# Patient Record
Sex: FEMALE | Race: WHITE | Employment: UNEMPLOYED | URBAN - METROPOLITAN AREA
[De-identification: names, ages, dates, MRNs, and addresses within clinical notes are randomized per-mention and may not be internally consistent; named-entity substitution may affect disease eponyms.]

---

## 2017-10-10 ENCOUNTER — OFFICE VISIT (OUTPATIENT)
Dept: PHYSICAL THERAPY | Facility: HOSPITAL | Age: 57
End: 2017-10-10
Attending: INTERNAL MEDICINE
Payer: COMMERCIAL

## 2017-10-10 DIAGNOSIS — M25.552 LEFT HIP PAIN: ICD-10-CM

## 2017-10-10 PROCEDURE — 97162 PT EVAL MOD COMPLEX 30 MIN: CPT

## 2017-10-10 PROCEDURE — 97530 THERAPEUTIC ACTIVITIES: CPT

## 2017-10-10 NOTE — PROGRESS NOTES
LOWER EXTREMITY EVALUATION:   Referring Physician: Dr. Damien Selby  Date of Onset: 2 weeks ago Date of Service: 10/10/2017   Diagnosis: Left hip pain  PATIENT SUMMARY:   Lindsay Padilla is a 62year old y/o female who presents to therapy today with complaints trunk in sitting  Gait: slow gait speed, slight antalgia    Lumbar screen:   Lumbar AROM: flex mod loss, ext severe loss, R lat flex no loss, L lat flex mod loss, R rotation no loss, L rotation min loss  Repeated motion testing:   Sitting with lumbar roll period. Treatment will include: Manual Therapy; Therapeutic Exercises; Neuromuscular Re-education; Therapeutic Activity;  Patient education; Home exercise program instruction    Education or treatment limitation: None  Rehab Potential:good    FOTO: 33/100

## 2017-10-12 ENCOUNTER — OFFICE VISIT (OUTPATIENT)
Dept: PHYSICAL THERAPY | Facility: HOSPITAL | Age: 57
End: 2017-10-12
Attending: INTERNAL MEDICINE
Payer: COMMERCIAL

## 2017-10-12 PROCEDURE — 97110 THERAPEUTIC EXERCISES: CPT

## 2017-10-16 ENCOUNTER — OFFICE VISIT (OUTPATIENT)
Dept: PHYSICAL THERAPY | Facility: HOSPITAL | Age: 57
End: 2017-10-16
Attending: INTERNAL MEDICINE
Payer: COMMERCIAL

## 2017-10-16 PROCEDURE — 97110 THERAPEUTIC EXERCISES: CPT

## 2017-10-16 NOTE — PROGRESS NOTES
Diagnosis:  Left hip pain  Authorized # of Visits:  3 (PPO)        Next MD visit: none scheduled  Fall Risk: standard         Precautions: n/a           Medication Changes since last visit?: No  Subjective: \"I was ok after last session and next day I feel symptoms by 75% or better to be able to walk 1 mile.   4. Patient will demo lumbar AROM min loss or better to be able to bend to tie her shoes without difficulty and sit with good/fair posture for 30 minutes    Plan: Continued core/hip strengthening exercis

## 2017-10-18 ENCOUNTER — OFFICE VISIT (OUTPATIENT)
Dept: PHYSICAL THERAPY | Facility: HOSPITAL | Age: 57
End: 2017-10-18
Attending: INTERNAL MEDICINE
Payer: COMMERCIAL

## 2017-10-18 PROCEDURE — 97110 THERAPEUTIC EXERCISES: CPT

## 2017-10-18 NOTE — PROGRESS NOTES
Diagnosis:  Left hip pain  Authorized # of Visits:  4 (PPO)        Next MD visit: none scheduled  Fall Risk: standard         Precautions: n/a           Medication Changes since last visit?: No  Subjective: \"I did all exercises at home and before go to sl Discussed patient POC with the PT.     Charges: TX x 3       Total Timed Treatment: 45 min  Total Treatment Time: 47 min

## 2017-10-23 ENCOUNTER — OFFICE VISIT (OUTPATIENT)
Dept: PHYSICAL THERAPY | Facility: HOSPITAL | Age: 57
End: 2017-10-23
Attending: INTERNAL MEDICINE
Payer: COMMERCIAL

## 2017-10-23 PROCEDURE — 97110 THERAPEUTIC EXERCISES: CPT

## 2017-10-23 NOTE — PROGRESS NOTES
Diagnosis:  Left hip pain  Authorized # of Visits:  5 (PPO)        Next MD visit: none scheduled  Fall Risk: standard         Precautions: n/a           Medication Changes since last visit?: No  Subjective: \"I doing good not much back pain only I have sli shoes without difficulty and sit with good/fair posture for 30 minutes    Plan: Continued core/hip strengthening exercises and gentle extension based low back exercises per pt tolerance. Patient has one schedule visit.  Discussed patient POC with the PT.

## 2017-10-27 ENCOUNTER — APPOINTMENT (OUTPATIENT)
Dept: PHYSICAL THERAPY | Facility: HOSPITAL | Age: 57
End: 2017-10-27
Attending: INTERNAL MEDICINE
Payer: COMMERCIAL

## 2017-10-30 ENCOUNTER — OFFICE VISIT (OUTPATIENT)
Dept: PHYSICAL THERAPY | Facility: HOSPITAL | Age: 57
End: 2017-10-30
Attending: INTERNAL MEDICINE
Payer: COMMERCIAL

## 2017-10-30 PROCEDURE — 97110 THERAPEUTIC EXERCISES: CPT

## 2017-10-30 NOTE — PROGRESS NOTES
DISCHARGE SUMMARY  Diagnosis:  Left hip pain  Authorized # of Visits:  6 (PPO)        Next MD visit: none scheduled  Fall Risk: standard         Precautions: n/a           Medication Changes since last visit?: No  Subjective: Patient reports her back is \" independent with HEP and it's progression. - MET  2. Patient will demo B hip strength at least 4+/5 to be able to negotiate stairs without difficulty. - MET  3. Patient will report decrease in symptoms by 75% or better to be able to walk 1 mile. - MET  4.

## 2018-01-09 ENCOUNTER — OFFICE VISIT (OUTPATIENT)
Dept: INTERNAL MEDICINE CLINIC | Facility: CLINIC | Age: 58
End: 2018-01-09

## 2018-01-09 VITALS
HEART RATE: 84 BPM | SYSTOLIC BLOOD PRESSURE: 142 MMHG | DIASTOLIC BLOOD PRESSURE: 80 MMHG | RESPIRATION RATE: 16 BRPM | BODY MASS INDEX: 31.58 KG/M2 | WEIGHT: 171.63 LBS | TEMPERATURE: 99 F | HEIGHT: 62 IN | OXYGEN SATURATION: 98 %

## 2018-01-09 DIAGNOSIS — R03.0 TRANSIENT ELEVATED BLOOD PRESSURE: ICD-10-CM

## 2018-01-09 DIAGNOSIS — J30.9 CHRONIC ALLERGIC RHINITIS: Primary | ICD-10-CM

## 2018-01-09 PROCEDURE — 99202 OFFICE O/P NEW SF 15 MIN: CPT | Performed by: INTERNAL MEDICINE

## 2018-01-09 NOTE — PROGRESS NOTES
HPI:    Patient ID: Marques Arroyo is a 62year old female. Pt here for eval of sinus issues . Has been using Astelin nasal spray. Has white coat syndrome at other apts  At [facility]. Review of Systems   Constitutional: Positive for fatigue.  Neg This Visit:  No prescriptions requested or ordered in this encounter    Imaging & Referrals:  None                Makeda Martinez MD  1/9/2018

## 2018-01-09 NOTE — PROGRESS NOTES
HPI:    Patient ID: Rich Brock is a 62year old female.         Review of Systems         Current Outpatient Prescriptions:  PREMARIN 0.625 MG/GM Vaginal Cream  Disp:  Rfl:    ALPRAZolam 0.25 MG Oral Tab  Disp:  Rfl: 2     Allergies:  Amoxicillin

## 2018-11-02 ENCOUNTER — TELEPHONE (OUTPATIENT)
Dept: INTERNAL MEDICINE CLINIC | Facility: CLINIC | Age: 58
End: 2018-11-02

## 2019-03-25 ENCOUNTER — OFFICE VISIT (OUTPATIENT)
Dept: INTEGRATIVE MEDICINE | Facility: CLINIC | Age: 59
End: 2019-03-25
Payer: COMMERCIAL

## 2019-03-25 DIAGNOSIS — Z71.3 NUTRITIONAL COUNSELING: ICD-10-CM

## 2019-03-25 DIAGNOSIS — E78.00 HIGH CHOLESTEROL: ICD-10-CM

## 2019-03-25 PROCEDURE — 97802 MEDICAL NUTRITION INDIV IN: CPT | Performed by: NUTRITIONIST

## 2019-03-25 NOTE — PATIENT INSTRUCTIONS
The products and items listed below (the “Products”)  and their claims have not been evaluated by the Food and Drug Administration. Dietary products are not intended to treat, prevent, mitigate or cure disease.  Ultimately, you must draw your own conclusi [place]  · [email or web address]  ___________  For any questions or concerns you may have, please do not hesitate to contact via   My Chart OR email- [email or web address]

## 2019-03-25 NOTE — PROGRESS NOTES
Patient referred by Dr. Riavs ref. provider found  Did patient complete Nutritional Therapy Questionnaire? Farhad Don is a 62year old female presenting for medical nutrition therapy in regards to weight loss.   Since January 20th, patient has lost overall health. Provide patient with healthy meal ideas as well. Emphasize to follow up with doctor to re-evaluate cholesterol levels and fatty liver.         Plan and Future Considerations:   Patient aware of risks and benefits and consented to medical n

## 2019-06-25 ENCOUNTER — ORDER TRANSCRIPTION (OUTPATIENT)
Dept: PHYSICAL THERAPY | Age: 59
End: 2019-06-25

## 2019-06-25 ENCOUNTER — OFFICE VISIT (OUTPATIENT)
Dept: PHYSICAL THERAPY | Age: 59
End: 2019-06-25
Attending: INTERNAL MEDICINE
Payer: COMMERCIAL

## 2019-06-25 DIAGNOSIS — M54.2 CERVICALGIA: ICD-10-CM

## 2019-06-25 DIAGNOSIS — M25.512 LEFT SHOULDER PAIN: Primary | ICD-10-CM

## 2019-06-25 DIAGNOSIS — M25.512 LEFT SHOULDER PAIN: ICD-10-CM

## 2019-06-25 PROCEDURE — 97110 THERAPEUTIC EXERCISES: CPT

## 2019-06-25 PROCEDURE — 97162 PT EVAL MOD COMPLEX 30 MIN: CPT

## 2019-06-25 NOTE — PROGRESS NOTES
SHOULDER EVALUATION:   Referring Physician: Dr. So Franklin PA-C / Dr. Angelina Reyes  Diagnosis: acute pain of L shoulder; cervicalgia      Date of Service: 6/25/2019     PATIENT SUMMARY   Herbert Lowe is a 62year old y/o female who presents to therap with L shoulder  Pt goals include \"get annoying pain to go away and return to weight lifting\"  Past medical history was reviewed with Citlalli Carrillo.  Significant findings include hysterectomy; appendectomy     Mattie Cohn presents to physical therapy evaluati adduction (P, to forearm)  (+) Speeds; (+) Empty Can     Today’s Treatment and Response:   Pt education was provided on exam findings, treatment diagnosis, treatment plan, expectations, and prognosis.  Pt was also provided recommendations for postural corre have any questions, please contact me at Dept: [phone]    Sincerely,  Electronically signed by therapist: Nilsa Garcia, PT,DPT,OCS  [de-identified] certification required: Yes  I certify the need for these services furnished under this plan of treatm

## 2019-06-26 ENCOUNTER — OFFICE VISIT (OUTPATIENT)
Dept: PHYSICAL THERAPY | Age: 59
End: 2019-06-26
Attending: INTERNAL MEDICINE
Payer: COMMERCIAL

## 2019-06-26 PROCEDURE — 97110 THERAPEUTIC EXERCISES: CPT

## 2019-06-26 NOTE — PROGRESS NOTES
Diagnosis: acute pain of L shoulder; cervicalgia     Authorized # of Visits: (10 per POC)   Insurance: Patricia CHAN            Next MD visit: (post therapy)      Fall Risk: standard         Precautions: NA   Medication Changes since last visit?: No  Subject

## 2019-07-10 ENCOUNTER — OFFICE VISIT (OUTPATIENT)
Dept: PHYSICAL THERAPY | Age: 59
End: 2019-07-10
Attending: INTERNAL MEDICINE
Payer: COMMERCIAL

## 2019-07-10 PROCEDURE — 97110 THERAPEUTIC EXERCISES: CPT

## 2019-07-10 NOTE — PROGRESS NOTES
Diagnosis: acute pain of L shoulder; cervicalgia     Authorized # of Visits: (10 per POC)   Insurance: [employer] [facility]            Next MD visit: (post therapy)      Fall Risk: standard         Precautions: NA   Medication Changes since last visit?: No  Subject    4) The patient will report a 50% to 75% improvement in ability to sleep without awakening secondary to pain       Plan:   Alleviate L shoulder pain; improve L shoulder AROM/mobility, DCNF strength, scapular strength, L shoulder rotational strength a

## 2019-07-17 ENCOUNTER — OFFICE VISIT (OUTPATIENT)
Dept: PHYSICAL THERAPY | Age: 59
End: 2019-07-17
Attending: INTERNAL MEDICINE
Payer: COMMERCIAL

## 2019-07-17 PROCEDURE — 97140 MANUAL THERAPY 1/> REGIONS: CPT

## 2019-07-17 PROCEDURE — 97110 THERAPEUTIC EXERCISES: CPT

## 2019-07-17 NOTE — PROGRESS NOTES
Diagnosis: acute pain of L shoulder; cervicalgia     Authorized # of Visits: (10 per POC)   Insurance: [employer]            Next MD visit: (post therapy)      Fall Risk: standard         Precautions: NA   Medication Changes since last visit?: No  Subject Reassess symptoms next session                    Goals:  1) The patient will be safe and independent with a progressive HEP necessary to manage ADLs (wash hair)     2) The patient will demonstrate 2/3 to 1 grade increase in L shoulder external rotation st

## 2019-07-23 ENCOUNTER — OFFICE VISIT (OUTPATIENT)
Dept: PHYSICAL THERAPY | Age: 59
End: 2019-07-23
Attending: INTERNAL MEDICINE
Payer: COMMERCIAL

## 2019-07-23 PROCEDURE — 97140 MANUAL THERAPY 1/> REGIONS: CPT

## 2019-07-23 PROCEDURE — 97110 THERAPEUTIC EXERCISES: CPT

## 2019-07-23 NOTE — PROGRESS NOTES
Diagnosis: acute pain of L shoulder; cervicalgia     Authorized # of Visits: (10 per POC)   Insurance: Leda CHAN            Next MD visit: (post therapy)      Fall Risk: standard         Precautions: NA   Medication Changes since last visit?: No  Subject sidelying and standing Sleeper stretch. The patient feels greatest stretch on her side with arm at 45 deg. The patient will continue with this for her HEP.  Reassess next session, including horizontal adduction (passive and active with band)

## 2019-07-24 ENCOUNTER — APPOINTMENT (OUTPATIENT)
Dept: PHYSICAL THERAPY | Age: 59
End: 2019-07-24
Attending: INTERNAL MEDICINE
Payer: COMMERCIAL

## 2019-07-31 ENCOUNTER — OFFICE VISIT (OUTPATIENT)
Dept: PHYSICAL THERAPY | Age: 59
End: 2019-07-31
Attending: INTERNAL MEDICINE
Payer: COMMERCIAL

## 2019-07-31 PROCEDURE — 97110 THERAPEUTIC EXERCISES: CPT

## 2019-07-31 NOTE — PROGRESS NOTES
Diagnosis: acute pain of L shoulder; cervicalgia     Authorized # of Visits: (10 per POC)   Insurance: [facility] [facility]            Next MD visit: (post therapy)      Fall Risk: standard         Precautions: NA   Medication Changes since last visit?: No  Subject rotator cuff as well as scapula to improve functional activity (reaching overhead). Noted poor stability with increase in lever arm. Pt educated in stability/strengthening program for HEP.  To continue to adjust her sleeping positions to alleviate pain

## 2019-08-02 ENCOUNTER — OFFICE VISIT (OUTPATIENT)
Dept: PHYSICAL THERAPY | Age: 59
End: 2019-08-02
Attending: INTERNAL MEDICINE
Payer: COMMERCIAL

## 2019-08-02 PROCEDURE — 97110 THERAPEUTIC EXERCISES: CPT

## 2019-08-02 NOTE — PROGRESS NOTES
Diagnosis: acute pain of L shoulder; cervicalgia     Authorized # of Visits: (10 per POC)   Insurance: Patricia CHAN            Next MD visit: (post therapy)      Fall Risk: standard         Precautions: NA   Medication Changes since last visit?: No  Subject The patient will demonstrate 2/3 to 1 grade increase in L shoulder external rotation strength necessary to lift and carry a bag of groceries without limitation       3) The patient will demonstrate > 170 deg L shoulder abduction ROM necessary to reach into

## 2019-08-07 ENCOUNTER — OFFICE VISIT (OUTPATIENT)
Dept: PHYSICAL THERAPY | Age: 59
End: 2019-08-07
Attending: INTERNAL MEDICINE
Payer: COMMERCIAL

## 2019-08-07 PROCEDURE — 97140 MANUAL THERAPY 1/> REGIONS: CPT

## 2019-08-07 PROCEDURE — 97110 THERAPEUTIC EXERCISES: CPT

## 2019-08-07 NOTE — PROGRESS NOTES
Diagnosis: acute pain of L shoulder; cervicalgia     Authorized # of Visits: (10 per POC)   Insurance: [employer]            Next MD visit: (post therapy)      Fall Risk: standard         Precautions: NA   Medication Changes since last visit?: No  Subject well as soft tissue mobility. Pt reported decrease in pain post session. Recommended patient continue with light stretches and c/s ROM exercises, holding on L shoulder strengthening.  Reassess next session                        Goals:  1) The patient will

## 2019-08-09 ENCOUNTER — OFFICE VISIT (OUTPATIENT)
Dept: PHYSICAL THERAPY | Age: 59
End: 2019-08-09
Attending: INTERNAL MEDICINE
Payer: COMMERCIAL

## 2019-08-09 PROCEDURE — 97110 THERAPEUTIC EXERCISES: CPT

## 2019-08-09 NOTE — PROGRESS NOTES
Diagnosis: acute pain of L shoulder; cervicalgia     Authorized # of Visits: (10 per POC)   Insurance: Clarence CHAN            Next MD visit: (post therapy)      Fall Risk: standard         Precautions: NA   Medication Changes since last visit?: No  Subject resistance training in gravity dependent positions for her L shoulder. Noted pain with abduction and external rotation motion.  Reviewed neck exercises and HEP, discussing relative rest and gradual return to a resistance program. Recommending patient trial

## 2019-08-14 ENCOUNTER — OFFICE VISIT (OUTPATIENT)
Dept: PHYSICAL THERAPY | Age: 59
End: 2019-08-14
Attending: INTERNAL MEDICINE
Payer: COMMERCIAL

## 2019-08-14 PROCEDURE — 97140 MANUAL THERAPY 1/> REGIONS: CPT

## 2019-08-14 PROCEDURE — 97110 THERAPEUTIC EXERCISES: CPT

## 2019-08-14 NOTE — PROGRESS NOTES
Diagnosis: acute pain of L shoulder; cervicalgia     Authorized # of Visits: (10 per POC)   Insurance: [employer] [facility]            Next MD visit: (post therapy)      Fall Risk: standard         Precautions: NA   Medication Changes since last visit?: No  Subject demonstrate 2/3 to 1 grade increase in L shoulder external rotation strength necessary to lift and carry a bag of groceries without limitation       3) The patient will demonstrate > 170 deg L shoulder abduction ROM necessary to reach into an overhead kitc

## 2019-08-27 ENCOUNTER — APPOINTMENT (OUTPATIENT)
Dept: PHYSICAL THERAPY | Age: 59
End: 2019-08-27
Attending: INTERNAL MEDICINE
Payer: COMMERCIAL

## 2019-09-25 ENCOUNTER — OFFICE VISIT (OUTPATIENT)
Dept: PHYSICAL THERAPY | Age: 59
End: 2019-09-25
Attending: ORTHOPAEDIC SURGERY
Payer: COMMERCIAL

## 2019-09-25 PROCEDURE — 97162 PT EVAL MOD COMPLEX 30 MIN: CPT

## 2019-09-25 PROCEDURE — 97110 THERAPEUTIC EXERCISES: CPT

## 2019-09-25 NOTE — PROGRESS NOTES
SHOULDER EVALUATION:   Referring Physician: Dr. Gail Carvalho [phone]  Diagnosis: incomplete tear of left rotator cuff (M75.112.) Date of Service: 9/25/2019     PATIENT SUMMARY   Karla Topete is a 61year old y/o female who presents to therapy today shoulder, no occupational and/or recreational limitations   Pt goals include: \"to get stronger and prevent surgery\"  Past medical history was reviewed with Jean Bhatti.  Significant findings include: hysterectomy; appendectomy     Livier Desai presents to ph modalities as needed [ice/heat] and postural corrections   Patient was instructed in and issued a HEP for: pendulums; sdly ER; sdly abd; L shoulder AAROM     Charges: PT Eval Moderate Complexity      Total Timed Treatment: 10 min     Total Treatment Time: care.    Thank you for your referral. Please co-sign or sign and return this letter via fax as soon as possible to [phone].  If you have any questions, please contact me at Dept: [phone]    Sincerely,  Electronically signed by therapist: Kimberley Welch

## 2019-10-02 ENCOUNTER — OFFICE VISIT (OUTPATIENT)
Dept: PHYSICAL THERAPY | Age: 59
End: 2019-10-02
Attending: ORTHOPAEDIC SURGERY
Payer: COMMERCIAL

## 2019-10-02 PROCEDURE — 97110 THERAPEUTIC EXERCISES: CPT

## 2019-10-02 NOTE — PROGRESS NOTES
Dx:  incomplete tear of left rotator cuff (M75.112.)        Insurance (Authorized # of Visits):  [facility]           Authorizing Physician: Dr. Roya Lozano Next MD visit: November 2019  Fall Risk: standard         Precautions: n/a             Subjective: Pt re shld abd 20x   L shld wall wash 20x  Pinky slide 'V' 2 x 10  Wall press up 20x                               HEP: (review) sdly shld abd/ER; wall press ups     Charges: there ex 3       Total Timed Treatment: 45 min  Total Treatment Time: 47 min

## 2019-10-14 ENCOUNTER — TELEPHONE (OUTPATIENT)
Dept: PHYSICAL THERAPY | Age: 59
End: 2019-10-14

## 2019-10-15 ENCOUNTER — APPOINTMENT (OUTPATIENT)
Dept: PHYSICAL THERAPY | Age: 59
End: 2019-10-15
Attending: ORTHOPAEDIC SURGERY
Payer: COMMERCIAL

## 2019-10-17 ENCOUNTER — OFFICE VISIT (OUTPATIENT)
Dept: PHYSICAL THERAPY | Age: 59
End: 2019-10-17
Attending: ORTHOPAEDIC SURGERY
Payer: COMMERCIAL

## 2019-10-17 PROCEDURE — 97110 THERAPEUTIC EXERCISES: CPT

## 2019-10-17 NOTE — PROGRESS NOTES
Dx:  incomplete tear of left rotator cuff (M75.112.)        Insurance (Authorized # of Visits):  [employer] PPO           Authorizing Physician: Dr. James Kwok Next MD visit: November 2019  Fall Risk: standard         Precautions: n/a             Subjective: Pt re TX#: 4 Date:                 TX#: 5 Date:    Tx#: 6   There ex  Passive L shoulder ROM ( all directions) x 10 min  Supine chest press cane 20x  Supine BUE flex 20x  Sdly ER (towel) 20x  Sdly shld abd 20x   L shld wall wash 20x  Pinky slide 'V' 2 x 10  Wa

## 2019-10-22 ENCOUNTER — OFFICE VISIT (OUTPATIENT)
Dept: PHYSICAL THERAPY | Age: 59
End: 2019-10-22
Attending: ORTHOPAEDIC SURGERY
Payer: COMMERCIAL

## 2019-10-22 PROCEDURE — 97110 THERAPEUTIC EXERCISES: CPT

## 2019-10-22 PROCEDURE — 97140 MANUAL THERAPY 1/> REGIONS: CPT

## 2019-10-22 NOTE — PROGRESS NOTES
Dx:  incomplete tear of left rotator cuff (M75.112.)        Insurance (Authorized # of Visits):  [employer] PPO           Authorizing Physician: Dr. Doe Rodriguez Next MD visit: November 2019  Fall Risk: standard         Precautions: n/a             Subjective: Pt re There ex  Passive L shoulder ROM ( all directions) x 10 min  Supine chest press cane 20x  Supine BUE flex 20x  Sdly ER (towel) 20x  Sdly shld abd 20x   L shld wall wash 20x  Pinky slide 'V' 2 x 10  Wall press up 20x    There ex  Passive L shoulder ROM (

## 2019-10-24 ENCOUNTER — OFFICE VISIT (OUTPATIENT)
Dept: PHYSICAL THERAPY | Age: 59
End: 2019-10-24
Attending: ORTHOPAEDIC SURGERY
Payer: COMMERCIAL

## 2019-10-24 PROCEDURE — 97110 THERAPEUTIC EXERCISES: CPT

## 2019-10-29 ENCOUNTER — OFFICE VISIT (OUTPATIENT)
Dept: PHYSICAL THERAPY | Age: 59
End: 2019-10-29
Attending: ORTHOPAEDIC SURGERY
Payer: COMMERCIAL

## 2019-10-29 PROCEDURE — 97110 THERAPEUTIC EXERCISES: CPT

## 2019-10-29 NOTE — PROGRESS NOTES
Dx:  incomplete tear of left rotator cuff (M75.112.)        Insurance (Authorized # of Visits):  [employer] PPO           Authorizing Physician: Dr. Mary Cummings Next MD visit: November 14, 2019  Fall Risk: standard         Precautions: n/a             Subjective: P TX#: 5 Date: 10/29/19   Tx#: 6   There ex  Passive L shoulder ROM ( all directions) x 10 min  Supine chest press cane 20x  Supine BUE flex 20x  Sdly ER (towel) 20x  Sdly shld abd 20x   L shld wall wash 20x  Pinky slide 'V' 2 x 10  Wall press up 20

## 2019-10-31 ENCOUNTER — OFFICE VISIT (OUTPATIENT)
Dept: PHYSICAL THERAPY | Age: 59
End: 2019-10-31
Attending: ORTHOPAEDIC SURGERY
Payer: COMMERCIAL

## 2019-10-31 PROCEDURE — 97110 THERAPEUTIC EXERCISES: CPT

## 2019-10-31 NOTE — PROGRESS NOTES
PROGRESS NOTE   Dx:  incomplete tear of left rotator cuff (M75.112.)        Insurance (Authorized # of Visits):  [facility]           Authorizing Physician: Dr. Radha Schmidt Next MD visit: November 14, 2019  Fall Risk: standard         Precautions: n/a without pain-PROG  · Pt will improve L shld abd/ER strength to at least 4+/5 to improve function with lifting and carrying groceries without limitation-PROG   · Pt will demonstrate increased mid/low trap strength to 4+/5 to promote improved shoulder mechan

## 2021-10-20 NOTE — PROGRESS NOTES
Diagnosis:  Left hip pain  Authorized # of Visits:  2 (PPO)        Next MD visit: none scheduled  Fall Risk: standard         Precautions: n/a           Medication Changes since last visit?: No  Subjective: \"I went this morning two stores for shopping and negative Regular rate & rhythm, normal S1, S2; no murmurs, gallops or rubs; no S3, S4

## 2022-10-10 ENCOUNTER — ORDER TRANSCRIPTION (OUTPATIENT)
Dept: PHYSICAL THERAPY | Facility: HOSPITAL | Age: 62
End: 2022-10-10

## 2022-10-10 DIAGNOSIS — M54.50 CHRONIC BILATERAL LOW BACK PAIN WITHOUT SCIATICA: Primary | ICD-10-CM

## 2022-10-10 DIAGNOSIS — G89.29 CHRONIC BILATERAL LOW BACK PAIN WITHOUT SCIATICA: Primary | ICD-10-CM

## 2022-10-10 DIAGNOSIS — G89.29 NECK PAIN, CHRONIC: ICD-10-CM

## 2022-10-10 DIAGNOSIS — M54.2 NECK PAIN, CHRONIC: ICD-10-CM

## 2022-11-11 ENCOUNTER — TELEPHONE (OUTPATIENT)
Dept: PHYSICAL THERAPY | Facility: HOSPITAL | Age: 62
End: 2022-11-11

## 2022-11-15 ENCOUNTER — OFFICE VISIT (OUTPATIENT)
Dept: PHYSICAL THERAPY | Age: 62
End: 2022-11-15
Attending: INTERNAL MEDICINE
Payer: COMMERCIAL

## 2022-11-15 DIAGNOSIS — M54.2 NECK PAIN, CHRONIC: ICD-10-CM

## 2022-11-15 DIAGNOSIS — G89.29 NECK PAIN, CHRONIC: ICD-10-CM

## 2022-11-15 DIAGNOSIS — G89.29 CHRONIC BILATERAL LOW BACK PAIN WITHOUT SCIATICA: ICD-10-CM

## 2022-11-15 DIAGNOSIS — M54.50 CHRONIC BILATERAL LOW BACK PAIN WITHOUT SCIATICA: ICD-10-CM

## 2022-11-15 PROCEDURE — 97162 PT EVAL MOD COMPLEX 30 MIN: CPT

## 2022-11-15 PROCEDURE — 97530 THERAPEUTIC ACTIVITIES: CPT

## 2022-11-15 PROCEDURE — 97110 THERAPEUTIC EXERCISES: CPT

## 2022-11-17 ENCOUNTER — OFFICE VISIT (OUTPATIENT)
Dept: PHYSICAL THERAPY | Age: 62
End: 2022-11-17
Attending: INTERNAL MEDICINE
Payer: COMMERCIAL

## 2022-11-17 DIAGNOSIS — G89.29 CHRONIC BILATERAL LOW BACK PAIN WITHOUT SCIATICA: ICD-10-CM

## 2022-11-17 DIAGNOSIS — M54.2 NECK PAIN, CHRONIC: ICD-10-CM

## 2022-11-17 DIAGNOSIS — M54.50 CHRONIC BILATERAL LOW BACK PAIN WITHOUT SCIATICA: ICD-10-CM

## 2022-11-17 DIAGNOSIS — G89.29 NECK PAIN, CHRONIC: ICD-10-CM

## 2022-11-17 PROCEDURE — 97110 THERAPEUTIC EXERCISES: CPT

## 2022-11-22 ENCOUNTER — OFFICE VISIT (OUTPATIENT)
Dept: PHYSICAL THERAPY | Age: 62
End: 2022-11-22
Attending: INTERNAL MEDICINE
Payer: COMMERCIAL

## 2022-11-22 DIAGNOSIS — G89.29 CHRONIC BILATERAL LOW BACK PAIN WITHOUT SCIATICA: ICD-10-CM

## 2022-11-22 DIAGNOSIS — G89.29 NECK PAIN, CHRONIC: ICD-10-CM

## 2022-11-22 DIAGNOSIS — M54.2 NECK PAIN, CHRONIC: ICD-10-CM

## 2022-11-22 DIAGNOSIS — M54.50 CHRONIC BILATERAL LOW BACK PAIN WITHOUT SCIATICA: ICD-10-CM

## 2022-11-22 PROCEDURE — 97110 THERAPEUTIC EXERCISES: CPT

## 2022-11-25 ENCOUNTER — APPOINTMENT (OUTPATIENT)
Dept: PHYSICAL THERAPY | Age: 62
End: 2022-11-25
Attending: INTERNAL MEDICINE
Payer: COMMERCIAL

## 2022-11-25 ENCOUNTER — OFFICE VISIT (OUTPATIENT)
Dept: PHYSICAL THERAPY | Age: 62
End: 2022-11-25
Attending: INTERNAL MEDICINE
Payer: COMMERCIAL

## 2022-11-25 PROCEDURE — 97110 THERAPEUTIC EXERCISES: CPT

## 2022-11-29 ENCOUNTER — OFFICE VISIT (OUTPATIENT)
Dept: PHYSICAL THERAPY | Age: 62
End: 2022-11-29
Attending: INTERNAL MEDICINE
Payer: COMMERCIAL

## 2022-11-29 DIAGNOSIS — G89.29 NECK PAIN, CHRONIC: ICD-10-CM

## 2022-11-29 DIAGNOSIS — M54.50 CHRONIC BILATERAL LOW BACK PAIN WITHOUT SCIATICA: ICD-10-CM

## 2022-11-29 DIAGNOSIS — M54.2 NECK PAIN, CHRONIC: ICD-10-CM

## 2022-11-29 DIAGNOSIS — G89.29 CHRONIC BILATERAL LOW BACK PAIN WITHOUT SCIATICA: ICD-10-CM

## 2022-11-29 PROCEDURE — 97110 THERAPEUTIC EXERCISES: CPT

## 2022-12-06 ENCOUNTER — APPOINTMENT (OUTPATIENT)
Dept: PHYSICAL THERAPY | Age: 62
End: 2022-12-06
Attending: INTERNAL MEDICINE
Payer: COMMERCIAL

## 2022-12-09 ENCOUNTER — OFFICE VISIT (OUTPATIENT)
Dept: PHYSICAL THERAPY | Age: 62
End: 2022-12-09
Attending: INTERNAL MEDICINE
Payer: COMMERCIAL

## 2022-12-09 PROCEDURE — 97110 THERAPEUTIC EXERCISES: CPT

## 2022-12-14 ENCOUNTER — OFFICE VISIT (OUTPATIENT)
Dept: PHYSICAL THERAPY | Age: 62
End: 2022-12-14
Attending: INTERNAL MEDICINE
Payer: COMMERCIAL

## 2022-12-14 PROCEDURE — 97110 THERAPEUTIC EXERCISES: CPT

## 2022-12-20 ENCOUNTER — APPOINTMENT (OUTPATIENT)
Dept: PHYSICAL THERAPY | Age: 62
End: 2022-12-20
Attending: INTERNAL MEDICINE
Payer: COMMERCIAL

## 2022-12-29 ENCOUNTER — APPOINTMENT (OUTPATIENT)
Dept: PHYSICAL THERAPY | Age: 62
End: 2022-12-29
Attending: INTERNAL MEDICINE
Payer: COMMERCIAL

## 2023-01-05 ENCOUNTER — OFFICE VISIT (OUTPATIENT)
Dept: PHYSICAL THERAPY | Age: 63
End: 2023-01-05
Attending: INTERNAL MEDICINE
Payer: COMMERCIAL

## 2023-01-05 PROCEDURE — 97110 THERAPEUTIC EXERCISES: CPT

## 2023-01-12 ENCOUNTER — OFFICE VISIT (OUTPATIENT)
Dept: PHYSICAL THERAPY | Age: 63
End: 2023-01-12
Attending: INTERNAL MEDICINE
Payer: COMMERCIAL

## 2023-01-12 PROCEDURE — 97110 THERAPEUTIC EXERCISES: CPT

## 2023-01-26 ENCOUNTER — OFFICE VISIT (OUTPATIENT)
Dept: PHYSICAL THERAPY | Age: 63
End: 2023-01-26
Attending: INTERNAL MEDICINE
Payer: COMMERCIAL

## 2023-01-26 PROCEDURE — 97110 THERAPEUTIC EXERCISES: CPT

## 2023-02-02 ENCOUNTER — OFFICE VISIT (OUTPATIENT)
Dept: PHYSICAL THERAPY | Age: 63
End: 2023-02-02
Attending: INTERNAL MEDICINE
Payer: COMMERCIAL

## 2023-02-02 PROCEDURE — 97110 THERAPEUTIC EXERCISES: CPT

## 2024-07-19 ENCOUNTER — ORDER TRANSCRIPTION (OUTPATIENT)
Dept: PHYSICAL THERAPY | Facility: HOSPITAL | Age: 64
End: 2024-07-19

## 2024-07-19 DIAGNOSIS — R27.0 ATAXIA: Primary | ICD-10-CM

## 2024-09-04 ENCOUNTER — TELEPHONE (OUTPATIENT)
Dept: PHYSICAL THERAPY | Facility: HOSPITAL | Age: 64
End: 2024-09-04

## 2024-09-05 ENCOUNTER — OFFICE VISIT (OUTPATIENT)
Dept: PHYSICAL THERAPY | Facility: HOSPITAL | Age: 64
End: 2024-09-05
Attending: INTERNAL MEDICINE
Payer: COMMERCIAL

## 2024-09-05 DIAGNOSIS — R27.0 ATAXIA: Primary | ICD-10-CM

## 2024-09-05 PROCEDURE — 97161 PT EVAL LOW COMPLEX 20 MIN: CPT

## 2024-09-05 NOTE — PATIENT INSTRUCTIONS
Access Code: [email or web address]  URL: [email or web address]  Date: 09/05/2024  Prepared by: Iona Lackey    Exercises  - Single Leg Stance  - 1 x daily - 7 x weekly - 2 sets - 30 sec hold  - Tandem Stance  - 1 x daily - 7 x weekly - 2 sets - 30 sec hold  - Standing Near Stance in Corner with Eyes Closed  - 1 x daily - 7 x weekly - 2 sets - 30 sec hold  - Tandem Walking Balance  - 1 x daily - 7 x weekly - 2 sets - 10 reps

## 2024-09-05 NOTE — PROGRESS NOTES
NEUROLOGICAL PHYSICAL THERAPY EVALUATION:   Referring Physician: Dr. Gates  Diagnosis: Gait instability, ataxia      Date of Onset: March 2024 Date of Service: 9/5/2024  [facility]     PATIENT SUMMARY   Apoorva Silva is a 64 year old y/o female who presents to therapy today with reports of intermittent gait instability.  History of current condition: Pt. Reports that she had episode of imbalance in March 2024:  had difficulty with walking on uneven surfaces, sam. Outisde, no dizziness.  Pt. Reported that her legs felt unsteady.  Pt. Had imaging that was unremarkable.  Symptoms have resolved and pt. Has returned to full activity level.      Current functional limitations include none currently.  Pt. Has resumed all activity.   Fall history: none  Social History: Pt. Lives with , not working, walking for exercise.    Pt describes pain level:  none  Prior level of function indep no limitations.  Pt goals include no issues with balance.  Past medical history was reviewed with Apoorva. Significant findings include Acute, but ill-defined, cerebrovascular disease, Allergic rhinitis, Anxiety, Arthritis, Asthma (HCC), Atherosclerosis of coronary artery, Calculus of kidney, Cancer (HCC), Congestive heart disease (HCC), COPD (chronic obstructive pulmonary disease) (HCC), Depression, Diabetes (HCC), Esophageal reflux, Essential hypertension, Hyperlipidemia, Hyperthyroidism, Hypothyroidism, Myocardial infarction (HCC), Obesity, Osteoarthritis, Sleep apnea, or Type 1 diabetes mellitus (HCC).        ASSESSMENT:   Apoorva presents to physical therapy evaluation with primary c/o episode of imbalance that has been resolved. The results of the objective tests and measures show no abnormalities in motor function or balance.  Pt. Does not have any functional limitations at this time.  PT discussed evaluation findings, pathology, plan of care with pt.  Pt. Was issued a maintenance balance program, and voiced understanding  and performs home exercise program correctly.  No indication for further PT at this time, but pt. encouraged to contact MD and return for PT if indicated due to decline in function.  Precautions: None      OBJECTIVE:   Observation/Posture:  Pt. Arrived without assistive device, no postural abnormalities  Sensation:  WNL for light touch screen, no reports of paresthesias.      Coordination Testing:   Finger to Nose: WNL  Pronation/Supination: WNL  Toe Tap: WNL    Tone: WNL both LE's    ROM: WNL both LE's    Strength:     Strength (-/5)    R L   Hip       Flexion 5 5     Extension 5 5     Abduction 5 5   Knee       Flexion 5 5     Extension 5 5   Foot / Ankle        DF 5 5     PF 5 5       Postural Control:  Romberg: EO 30 sec, EC 30 sec  Tandem Stance: R back EO 30 sec, L back EO 30 sec  SLS: R EO 5 sec,  L EO 5 sec    Functional Mobility:  Bed Mobility: indep  Sit to/from stand: indep without hands  Gait: pt ambulates on level ground with normal mechanics.   5x Sit to Stand: 8.5 sec  Gait speed: 1.3 m/sec = WNL    Functional Gait Assessment   Item Description Score (0 worst, 3 best)    ____3___1. Gait Level Surface-  Time: ____4.5______seconds  ___3____2. Change in gait speed  ___3____3. Gait with horizontal head turns   ___3____4. Gait with vertical head turns  ___3____5. Gait and pivot turn  ___3____6. Step over obstacle  ___3____7. Gait with narrow base of support-  # of steps___10_____  ___2____8. Gait with eyes closed-  Time: _____7_____seconds  ___3____9. Ambulating backward  ___3____10. Steps    TOTAL SCORE: ___29____/30    (less than 22/30 = fall risk)      Today’s Treatment and Response: Pt education was provided on exam findings and objective testing.  Pt was also provided recommendations for importance of remaining active.  Patient was instructed in and issued a Home Exercise Program to use for balance maintenance.    Charges: PT Eval x1- low complexity   Total Timed Treatment: 40 min     Total Treatment  Time: 40 min           PLAN OF CARE:    Goals to be achieved in the next 90 days:  N/A    Frequency / Duration: No indication for further PT at this time.    Education or treatment limitation: None  Rehab Potential:good    Patient was advised of these findings, precautions, and treatment options and has agreed to actively participate in planning and for this course of care.    Thank you for your referral.  If you have any questions, please contact me at [phone].    Sincerely,  Iona Lackey PT, MIKHAIL      Electronically signed by therapist: Iona Lackey PT, MIKHAIL    Physician's certification required: Yes  I certify the need for these services furnished under this plan of treatment and while under my care.    X___________________________________________________ Date____________________    Certification From: 9/5/2024  To:12/4/2024

## 2024-09-10 ENCOUNTER — APPOINTMENT (OUTPATIENT)
Dept: PHYSICAL THERAPY | Facility: HOSPITAL | Age: 64
End: 2024-09-10
Attending: INTERNAL MEDICINE
Payer: COMMERCIAL

## 2024-09-12 ENCOUNTER — APPOINTMENT (OUTPATIENT)
Dept: PHYSICAL THERAPY | Facility: HOSPITAL | Age: 64
End: 2024-09-12
Attending: INTERNAL MEDICINE
Payer: COMMERCIAL

## 2024-09-17 ENCOUNTER — APPOINTMENT (OUTPATIENT)
Dept: PHYSICAL THERAPY | Facility: HOSPITAL | Age: 64
End: 2024-09-17
Attending: INTERNAL MEDICINE
Payer: COMMERCIAL

## 2024-09-19 ENCOUNTER — APPOINTMENT (OUTPATIENT)
Dept: PHYSICAL THERAPY | Facility: HOSPITAL | Age: 64
End: 2024-09-19
Attending: INTERNAL MEDICINE
Payer: COMMERCIAL

## 2024-09-24 ENCOUNTER — APPOINTMENT (OUTPATIENT)
Dept: PHYSICAL THERAPY | Facility: HOSPITAL | Age: 64
End: 2024-09-24
Attending: INTERNAL MEDICINE
Payer: COMMERCIAL

## 2024-09-26 ENCOUNTER — APPOINTMENT (OUTPATIENT)
Dept: PHYSICAL THERAPY | Facility: HOSPITAL | Age: 64
End: 2024-09-26
Attending: INTERNAL MEDICINE
Payer: COMMERCIAL

## 2024-10-01 ENCOUNTER — APPOINTMENT (OUTPATIENT)
Dept: PHYSICAL THERAPY | Facility: HOSPITAL | Age: 64
End: 2024-10-01
Attending: INTERNAL MEDICINE
Payer: COMMERCIAL

## 2024-10-03 ENCOUNTER — APPOINTMENT (OUTPATIENT)
Dept: PHYSICAL THERAPY | Facility: HOSPITAL | Age: 64
End: 2024-10-03
Attending: INTERNAL MEDICINE
Payer: COMMERCIAL

## 2024-10-08 ENCOUNTER — APPOINTMENT (OUTPATIENT)
Dept: PHYSICAL THERAPY | Facility: HOSPITAL | Age: 64
End: 2024-10-08
Attending: INTERNAL MEDICINE
Payer: COMMERCIAL

## (undated) NOTE — LETTER
Patient Name: Nguyễn Cancer  YOB: 1960          MRN number:  [id number]  Date:  6/25/2019  Referring Physician: Shannon Pozo PA-C       SHOULDER EVALUATION:    Referring Physician: Dr. Shannon Pozo PA-C / Dr. Nasreen Edward  Diagnosis: acute lifting groceries; reaching overhead (worse with pressure through elbow)       Adrian Medrano describes prior level of function: One instance (> 5 years ago) of neck pain that resolved on its own.  Denies prior issues with L shoulder  Pt goals include \"get annoying ER: R 4+/5; L 4-/5  IR: R 5/5; L 5/5 Flexion: R 5/5; L 4+/5  Extension: R 4/5; L 4/5   Rhomboids:  L 4/5  Serratus anterior: L 4-/5  Low trap:; L 3+/5     Special tests:   (+) Impingement with abduction and horizontal adduction (P, to forearm)  (+) Speeds; options and has agreed to actively participate in planning and for this course of care. Thank you for your referral. Please co-sign or sign and return this letter via fax as soon as possible to [phone].  If you have any questions, please contact me

## (undated) NOTE — LETTER
Patient Name: Shayy Baeza  YOB: 1960          MRN number:  [id number]  Date:  9/25/2019  Referring Physician: Dr. Shanae Monson:    Referring Physician: Dr. Shital Soares   Diagnosis: incomplete tear of left rotator cuff (M75 Harpal describes prior level of function: Current symptoms have been effecting function for past 1 year, better with injection.  Prior to one year ago, patient denies injury to her L shoulder, no occupational and/or recreational limitations   Pt goals include Today’s Treatment and Response:   Pt education was provided on exam findings, treatment diagnosis, treatment plan, expectations, and prognosis.  Pt was also provided recommendations for modalities as needed [ice/heat] and postural corrections   Patient was FOTO: (next session)    The patient was advised of these findings, precautions, and treatment options and has agreed to actively participate in planning and for this course of care.     Thank you for your referral. Please co-sign or sign and return this let